# Patient Record
(demographics unavailable — no encounter records)

---

## 2025-03-27 NOTE — DISCUSSION/SUMMARY
No [FreeTextEntry1] : We discussed potential side effect of statin on HgbA1c. Explained indication for statin, and most importantly to work on diet and exercise and wt loss. Contin current meds. Sched exercise stress test, he is > 2 yrs s/p multivessel PCI without typical symptoms. Follow up 6 mo  [EKG obtained to assist in diagnosis and management of assessed problem(s)] : EKG obtained to assist in diagnosis and management of assessed problem(s)

## 2025-07-29 NOTE — HISTORY OF PRESENT ILLNESS
[FreeTextEntry1] : Compl of recent left leg and ankle swelling assoc with recent poor diet, wt gain and inc salt intake. He has since adjusted his diet and edema has improved partially, and wt down partially. No CP or SOB. He took one extra dose hctz 12.5 mg.